# Patient Record
Sex: MALE | Race: WHITE | NOT HISPANIC OR LATINO | Employment: PART TIME | URBAN - METROPOLITAN AREA
[De-identification: names, ages, dates, MRNs, and addresses within clinical notes are randomized per-mention and may not be internally consistent; named-entity substitution may affect disease eponyms.]

---

## 2017-11-02 ENCOUNTER — APPOINTMENT (EMERGENCY)
Dept: RADIOLOGY | Facility: HOSPITAL | Age: 32
End: 2017-11-02

## 2017-11-02 ENCOUNTER — HOSPITAL ENCOUNTER (EMERGENCY)
Facility: HOSPITAL | Age: 32
Discharge: HOME | End: 2017-11-03
Attending: EMERGENCY MEDICINE | Admitting: EMERGENCY MEDICINE

## 2017-11-02 DIAGNOSIS — R07.9 CHEST PAIN: Primary | ICD-10-CM

## 2017-11-02 LAB
ALBUMIN SERPL BCP-MCNC: 4.5 G/DL (ref 3.5–5)
ALP SERPL-CCNC: 74 U/L (ref 46–116)
ALT SERPL W P-5'-P-CCNC: 72 U/L (ref 12–78)
ANION GAP SERPL CALCULATED.3IONS-SCNC: 8 MMOL/L (ref 4–13)
AST SERPL W P-5'-P-CCNC: 34 U/L (ref 5–45)
BASOPHILS # BLD AUTO: 0.04 THOUSANDS/ΜL (ref 0–0.1)
BASOPHILS NFR BLD AUTO: 1 % (ref 0–1)
BILIRUB SERPL-MCNC: 0.8 MG/DL (ref 0.2–1)
BUN SERPL-MCNC: 19 MG/DL (ref 5–25)
CALCIUM SERPL-MCNC: 9.3 MG/DL (ref 8.3–10.1)
CHLORIDE SERPL-SCNC: 101 MMOL/L (ref 100–108)
CK SERPL-CCNC: 143 U/L (ref 39–308)
CO2 SERPL-SCNC: 28 MMOL/L (ref 21–32)
CREAT SERPL-MCNC: 1.09 MG/DL (ref 0.6–1.3)
DEPRECATED D DIMER PPP: <270 NG/ML (FEU) (ref 0–424)
EOSINOPHIL # BLD AUTO: 0.14 THOUSAND/ΜL (ref 0–0.61)
EOSINOPHIL NFR BLD AUTO: 2 % (ref 0–6)
ERYTHROCYTE [DISTWIDTH] IN BLOOD BY AUTOMATED COUNT: 12.4 % (ref 11.6–15.1)
GFR SERPL CREATININE-BSD FRML MDRD: 89 ML/MIN/1.73SQ M
GLUCOSE SERPL-MCNC: 90 MG/DL (ref 65–140)
HCT VFR BLD AUTO: 44.6 % (ref 36.5–49.3)
HGB BLD-MCNC: 15.3 G/DL (ref 12–17)
LYMPHOCYTES # BLD AUTO: 2.09 THOUSANDS/ΜL (ref 0.6–4.47)
LYMPHOCYTES NFR BLD AUTO: 31 % (ref 14–44)
MCH RBC QN AUTO: 28.1 PG (ref 26.8–34.3)
MCHC RBC AUTO-ENTMCNC: 34.3 G/DL (ref 31.4–37.4)
MCV RBC AUTO: 82 FL (ref 82–98)
MONOCYTES # BLD AUTO: 0.65 THOUSAND/ΜL (ref 0.17–1.22)
MONOCYTES NFR BLD AUTO: 10 % (ref 4–12)
NEUTROPHILS # BLD AUTO: 3.92 THOUSANDS/ΜL (ref 1.85–7.62)
NEUTS SEG NFR BLD AUTO: 56 % (ref 43–75)
PLATELET # BLD AUTO: 251 THOUSANDS/UL (ref 149–390)
PMV BLD AUTO: 11.2 FL (ref 8.9–12.7)
POTASSIUM SERPL-SCNC: 3.8 MMOL/L (ref 3.5–5.3)
PROT SERPL-MCNC: 7.8 G/DL (ref 6.4–8.2)
RBC # BLD AUTO: 5.45 MILLION/UL (ref 3.88–5.62)
SODIUM SERPL-SCNC: 137 MMOL/L (ref 136–145)
TROPONIN I SERPL-MCNC: <0.02 NG/ML
WBC # BLD AUTO: 6.84 THOUSAND/UL (ref 4.31–10.16)

## 2017-11-02 PROCEDURE — 80053 COMPREHEN METABOLIC PANEL: CPT | Performed by: EMERGENCY MEDICINE

## 2017-11-02 PROCEDURE — 36415 COLL VENOUS BLD VENIPUNCTURE: CPT | Performed by: EMERGENCY MEDICINE

## 2017-11-02 PROCEDURE — 93005 ELECTROCARDIOGRAM TRACING: CPT

## 2017-11-02 PROCEDURE — 85379 FIBRIN DEGRADATION QUANT: CPT | Performed by: EMERGENCY MEDICINE

## 2017-11-02 PROCEDURE — 93005 ELECTROCARDIOGRAM TRACING: CPT | Performed by: EMERGENCY MEDICINE

## 2017-11-02 PROCEDURE — 71020 HB CHEST X-RAY 2VW FRONTAL&LATL: CPT

## 2017-11-02 PROCEDURE — 84484 ASSAY OF TROPONIN QUANT: CPT | Performed by: EMERGENCY MEDICINE

## 2017-11-02 PROCEDURE — 85025 COMPLETE CBC W/AUTO DIFF WBC: CPT | Performed by: EMERGENCY MEDICINE

## 2017-11-02 PROCEDURE — 82550 ASSAY OF CK (CPK): CPT | Performed by: EMERGENCY MEDICINE

## 2017-11-02 RX ORDER — ASPIRIN 81 MG/1
162 TABLET, CHEWABLE ORAL ONCE
Status: COMPLETED | OUTPATIENT
Start: 2017-11-02 | End: 2017-11-02

## 2017-11-02 RX ORDER — 0.9 % SODIUM CHLORIDE 0.9 %
3 VIAL (ML) INJECTION AS NEEDED
Status: DISCONTINUED | OUTPATIENT
Start: 2017-11-02 | End: 2017-11-03 | Stop reason: HOSPADM

## 2017-11-02 RX ADMIN — NITROGLYCERIN 0.5 INCH: 20 OINTMENT TOPICAL at 23:06

## 2017-11-02 RX ADMIN — ASPIRIN 81 MG 162 MG: 81 TABLET ORAL at 23:05

## 2017-11-03 VITALS
SYSTOLIC BLOOD PRESSURE: 113 MMHG | WEIGHT: 181.5 LBS | HEART RATE: 88 BPM | OXYGEN SATURATION: 97 % | TEMPERATURE: 98 F | DIASTOLIC BLOOD PRESSURE: 68 MMHG | RESPIRATION RATE: 18 BRPM

## 2017-11-03 LAB
ATRIAL RATE: 82 BPM
ATRIAL RATE: 88 BPM
P AXIS: 32 DEGREES
P AXIS: 42 DEGREES
PR INTERVAL: 140 MS
PR INTERVAL: 160 MS
QRS AXIS: 66 DEGREES
QRS AXIS: 70 DEGREES
QRSD INTERVAL: 90 MS
QRSD INTERVAL: 90 MS
QT INTERVAL: 344 MS
QT INTERVAL: 354 MS
QTC INTERVAL: 401 MS
QTC INTERVAL: 404 MS
T WAVE AXIS: 37 DEGREES
T WAVE AXIS: 39 DEGREES
TROPONIN I SERPL-MCNC: <0.02 NG/ML
VENTRICULAR RATE: 77 BPM
VENTRICULAR RATE: 83 BPM

## 2017-11-03 PROCEDURE — 99285 EMERGENCY DEPT VISIT HI MDM: CPT

## 2017-11-03 PROCEDURE — 36415 COLL VENOUS BLD VENIPUNCTURE: CPT | Performed by: EMERGENCY MEDICINE

## 2017-11-03 PROCEDURE — 84484 ASSAY OF TROPONIN QUANT: CPT | Performed by: EMERGENCY MEDICINE

## 2017-11-03 NOTE — ED PROVIDER NOTES
History  Chief Complaint   Patient presents with    Chest Pain     Pt reports intermittent CP and SOB for 7 days  Patient is a 28year old male with 1 week of intermittent chest pain with sob and pleuritic pain  Is visiting family here and came from [place]  Denies cocaine use  No fever  No N/V  No cough  Denies smoking  Father had MI at age 58  No recent old records from this ED seen on computer system  [facility] website checked on this patient and patient not found  Tried ibuprofen which made pain worse  History provided by:  Patient   used: No    Chest Pain   Associated symptoms: shortness of breath    Associated symptoms: no cough, no fever, no nausea and not vomiting        None       History reviewed  No pertinent past medical history  History reviewed  No pertinent surgical history  No family history on file  I have reviewed and agree with the history as documented  Social History   Substance Use Topics    Smoking status: Never Smoker    Smokeless tobacco: Never Used    Alcohol use No        Review of Systems   Constitutional: Negative for fever  Respiratory: Positive for shortness of breath  Negative for cough  Cardiovascular: Positive for chest pain  Gastrointestinal: Negative for nausea and vomiting  All other systems reviewed and are negative        Physical Exam  ED Triage Vitals   Temperature Pulse Respirations Blood Pressure SpO2   11/02/17 2244 11/02/17 2244 11/02/17 2244 11/02/17 2244 11/02/17 2244   98 °F (36 7 °C) 92 18 137/81 98 %      Temp Source Heart Rate Source Patient Position - Orthostatic VS BP Location FiO2 (%)   11/02/17 2244 11/02/17 2244 11/02/17 2244 11/02/17 2244 --   Oral Monitor Sitting Right arm       Pain Score       11/02/17 2259       5           Orthostatic Vital Signs  Vitals:    11/02/17 2244 11/03/17 0000 11/03/17 0116   BP: 137/81 127/82 123/69   Pulse: 92 87 81   Patient Position - Orthostatic VS: Sitting Lying Lying       Physical Exam   Constitutional: He is oriented to person, place, and time  He appears well-developed and well-nourished  He appears distressed (mild)  HENT:   Head: Normocephalic and atraumatic  Mouth/Throat: Oropharynx is clear and moist    Eyes: No scleral icterus  Neck: Normal range of motion  Neck supple  Cardiovascular: Normal rate, regular rhythm and normal heart sounds  No murmur heard  Pulmonary/Chest: Effort normal and breath sounds normal  No stridor  No respiratory distress  He has no wheezes  He has no rales  He exhibits no tenderness  Abdominal: Soft  Bowel sounds are normal  He exhibits no distension  There is no tenderness  Musculoskeletal: He exhibits no edema, tenderness (No calf tenderness) or deformity  Neurological: He is alert and oriented to person, place, and time  Skin: Skin is warm and dry  No rash noted  Psychiatric: He has a normal mood and affect  Nursing note and vitals reviewed  ED Medications  Medications   sodium chloride (PF) 0 9 % injection 3 mL (not administered)   aspirin chewable tablet 162 mg (162 mg Oral Given 11/2/17 2305)   nitroglycerin (NITRO-BID) 2 % TD ointment 0 5 inch (0 5 inches Topical Given 11/2/17 2306)       Diagnostic Studies  Results Reviewed     Procedure Component Value Units Date/Time    Troponin I [[id number]]  (Normal) Collected:  11/03/17 0115    Lab Status:  Final result Specimen:  Blood from Arm, Left Updated:  11/03/17 0138     Troponin I <0 02 ng/mL     Narrative:         Siemens Chemistry analyzer 99% cutoff is > 0 04 ng/mL in network labs    o cTnI 99% cutoff is useful only when applied to patients in the clinical setting of myocardial ischemia  o cTnI 99% cutoff should be interpreted in the context of clinical history, ECG findings and possibly cardiac imaging to establish correct diagnosis    o cTnI 99% cutoff may be suggestive but clearly not indicative of a coronary event without the clinical setting of myocardial ischemia  CK Total with Reflex CKMB [[id number]]  (Normal) Collected:  11/02/17 2315    Lab Status:  Final result Specimen:  Blood from Arm, Left Updated:  11/02/17 2356     Total  U/L     D-dimer, quantitative [[id number]]  (Normal) Collected:  11/02/17 2315    Lab Status:  Final result Specimen:  Blood from Arm, Left Updated:  11/02/17 2355     D-Dimer, Junious Andi <270 ng/ml (FEU)     Troponin I [[id number]]  (Normal) Collected:  11/02/17 2315    Lab Status:  Final result Specimen:  Blood from Arm, Left Updated:  11/02/17 2340     Troponin I <0 02 ng/mL     Narrative:         Siemens Chemistry analyzer 99% cutoff is > 0 04 ng/mL in network labs    o cTnI 99% cutoff is useful only when applied to patients in the clinical setting of myocardial ischemia  o cTnI 99% cutoff should be interpreted in the context of clinical history, ECG findings and possibly cardiac imaging to establish correct diagnosis  o cTnI 99% cutoff may be suggestive but clearly not indicative of a coronary event without the clinical setting of myocardial ischemia  Comprehensive metabolic panel [[id number]] Collected:  11/02/17 2315    Lab Status:  Final result Specimen:  Blood from Arm, Left Updated:  11/02/17 2339     Sodium 137 mmol/L      Potassium 3 8 mmol/L      Chloride 101 mmol/L      CO2 28 mmol/L      Anion Gap 8 mmol/L      BUN 19 mg/dL      Creatinine 1 09 mg/dL      Glucose 90 mg/dL      Calcium 9 3 mg/dL      AST 34 U/L      ALT 72 U/L      Alkaline Phosphatase 74 U/L      Total Protein 7 8 g/dL      Albumin 4 5 g/dL      Total Bilirubin 0 80 mg/dL      eGFR 89 ml/min/1 73sq m     Narrative:         National Kidney Disease Education Program recommendations are as follows:  GFR calculation is accurate only with a steady state creatinine  Chronic Kidney disease less than 60 ml/min/1 73 sq  meters  Kidney failure less than 15 ml/min/1 73 sq  meters      CBC and differential [[id number]]  (Normal) Collected:  11/02/17 2315    Lab Status:  Final result Specimen:  Blood from Arm, Left Updated:  11/02/17 2322     WBC 6 84 Thousand/uL      RBC 5 45 Million/uL      Hemoglobin 15 3 g/dL      Hematocrit 44 6 %      MCV 82 fL      MCH 28 1 pg      MCHC 34 3 g/dL      RDW 12 4 %      MPV 11 2 fL      Platelets 553 Thousands/uL      Neutrophils Relative 56 %      Lymphocytes Relative 31 %      Monocytes Relative 10 %      Eosinophils Relative 2 %      Basophils Relative 1 %      Neutrophils Absolute 3 92 Thousands/µL      Lymphocytes Absolute 2 09 Thousands/µL      Monocytes Absolute 0 65 Thousand/µL      Eosinophils Absolute 0 14 Thousand/µL      Basophils Absolute 0 04 Thousands/µL                  X-ray chest 2 views   ED Interpretation by Viktor Smith MD (11/02 0213)   No acute disease read by me                    Procedures  ECG 12 Lead Documentation  Date/Time: 11/2/2017 11:00 PM  Performed by: Kirti Boles  Authorized by: Kirti Boles     Indications / Diagnosis:  Chest pain  ECG reviewed by me, the ED Provider: yes    Patient location:  ED  Previous ECG:     Previous ECG:  Unavailable  Interpretation:     Interpretation: normal    Rate:     ECG rate:  83    ECG rate assessment: normal    Rhythm:     Rhythm: sinus rhythm    Ectopy:     Ectopy: none    QRS:     QRS axis:  Normal  Conduction:     Conduction: normal    ST segments:     ST segments:  Normal  T waves:     T waves: normal      ECG 12 Lead Documentation  Date/Time: 11/3/2017 1:17 AM  Performed by: Kirti Boles  Authorized by: Kirti Boles     Indications / Diagnosis:  Repeat EKG for chest pain  ECG reviewed by me, the ED Provider: yes    Patient location:  ED  Previous ECG:     Previous ECG:  Compared to current    Comparison ECG info:  From earlier tonight    Similarity:  No change  Interpretation:     Interpretation: normal    Rate:     ECG rate:  77    ECG rate assessment: normal    Rhythm:     Rhythm: sinus rhythm    Ectopy:     Ectopy: none QRS:     QRS axis:  Normal  Conduction:     Conduction: normal    ST segments:     ST segments:  Normal  T waves:     T waves: normal             Phone Contacts  ED Phone Contact    ED Course  ED Course as of Nov 03 0145   Thu Nov 02, 2017   2358 Pain improved  Will recheck EKG and troponin at 0115  Fri Nov 03, 2017   0143 Patient resting comfortably prior to discharge  HEART Risk Score    Flowsheet Row Most Recent Value   History  1 Filed at: 11/03/2017 0144   ECG  0 Filed at: 11/03/2017 0144   Age  0 Filed at: 11/03/2017 0144   Risk Factors  1 Filed at: 11/03/2017 0144   Troponin  0 Filed at: 11/03/2017 0144   Heart Score Risk Calculator   History  1 Filed at: 11/03/2017 0144   ECG  0 Filed at: 11/03/2017 0144   Age  0 Filed at: 11/03/2017 0144   Risk Factors  1 Filed at: 11/03/2017 0144   Troponin  0 Filed at: 11/03/2017 0144   HEART Score  2 Filed at: 11/03/2017 0144   HEART Score  2 Filed at: 11/03/2017 0144                    LYNDSEY Risk Score    Flowsheet Row Most Recent Value   Age >= 72  0 Filed at: 11/03/2017 0144   Known CAD (stenosis >= 50%)  0 Filed at: 11/03/2017 0144   Recent (<=24 hrs) Service Angina  0 Filed at: 11/03/2017 0144   ST Deviation >= 0 5 mm  0 Filed at: 11/03/2017 0144   3+ CAD Risk Factors (FHx, HTN, HLP, DM, Smoker)  0 Filed at: 11/03/2017 0144   Aspirin Use Past 7 Days  0 Filed at: 11/03/2017 0144   Elevated Cardiac Markers  0 Filed at: 11/03/2017 0144   LYNDSEY Risk Score (Calculated)  0 Filed at: 11/03/2017 0144              MDM  Number of Diagnoses or Management Options  Diagnosis management comments: DDX including but not limited to: ACS, MI, PE, PTX, pneumonia, doubt dissection, pleurisy, pericarditis, myocarditis, rhabdomyolysis, GI etiology          Amount and/or Complexity of Data Reviewed  Clinical lab tests: ordered and reviewed  Tests in the radiology section of CPT®: ordered and reviewed  Decide to obtain previous medical records or to obtain history from someone other than the patient: yes  Independent visualization of images, tracings, or specimens: yes      CritCare Time    Disposition  Final diagnoses:   Chest pain     Time reflects when diagnosis was documented in both MDM as applicable and the Disposition within this note     Time User Action Codes Description Comment    11/3/2017  1:44 AM Falkland Leonico Add [R07 9] Chest pain       ED Disposition     ED Disposition Condition Comment    Discharge  [address]  discharge to home/self care  Condition at discharge: Stable        Follow-up Information     Follow up With Specialties Details Why Contact Info    info link  Call in 1 day aspirin for pain  Return sooner if increased pain, fever, vomiting, difficulty breathing, rash  [phone]          Patient's Medications    No medications on file     No discharge procedures on file      ED Provider  Electronically Signed by           Conner Whitney MD  11/03/17 8875

## 2017-11-03 NOTE — DISCHARGE INSTRUCTIONS
Chest Pain   WHAT YOU NEED TO KNOW:   Chest pain can be caused by a range of conditions, from not serious to life-threatening  Chest pain can be a symptom of a digestive problem, such as acid reflux or a stomach ulcer  An anxiety attack or a strong emotion, such as anger, can also cause chest pain  Infection, inflammation, or a fracture in the bones or cartilage in your chest can cause pain or discomfort  Sometimes chest pain or pressure is caused by poor blood flow to your heart (angina)  Chest pain may also be caused by life-threatening conditions such as a heart attack or blood clot in your lungs  DISCHARGE INSTRUCTIONS:   Call 911 if:   · You have any of the following signs of a heart attack:      ¨ Squeezing, pressure, or pain in your chest that lasts longer than 5 minutes or returns    ¨ Discomfort or pain in your back, neck, jaw, stomach, or arm     ¨ Trouble breathing    ¨ Nausea or vomiting    ¨ Lightheadedness or a sudden cold sweat, especially with chest pain or trouble breathing    Return to the emergency department if:   · You have chest discomfort that gets worse, even with medicine  · You cough or vomit blood  · Your bowel movements are black or bloody  · You cannot stop vomiting, or it hurts to swallow  Contact your healthcare provider if:   · You have questions or concerns about your condition or care  Medicines:   · Medicines  may be given to treat the cause of your chest pain  Examples include pain medicine, anxiety medicine, or medicines to increase blood flow to your heart  · Do not take certain medicines without asking your healthcare provider first   These include NSAIDs, herbal or vitamin supplements, or hormones (estrogen or progestin)  · Take your medicine as directed  Contact your healthcare provider if you think your medicine is not helping or if you have side effects  Tell him or her if you are allergic to any medicine   Keep a list of the medicines, vitamins, and herbs you take  Include the amounts, and when and why you take them  Bring the list or the pill bottles to follow-up visits  Carry your medicine list with you in case of an emergency  Follow up with your healthcare provider within 72 hours, or as directed: You may need to return for more tests to find the cause of your chest pain  You may be referred to a specialist, such as a cardiologist or gastroenterologist  Write down your questions so you remember to ask them during your visits  Healthy living tips: The following are general healthy guidelines  If your chest pain is caused by a heart problem, your healthcare provider will give you specific guidelines to follow  · Do not smoke  Nicotine and other chemicals in cigarettes and cigars can cause lung and heart damage  Ask your healthcare provider for information if you currently smoke and need help to quit  E-cigarettes or smokeless tobacco still contain nicotine  Talk to your healthcare provider before you use these products  · Eat a variety of healthy, low-fat foods  Healthy foods include fruits, vegetables, whole-grain breads, low-fat dairy products, beans, lean meats, and fish  Ask for more information about a heart healthy diet  · Ask about activity  Your healthcare provider will tell you which activities to limit or avoid  Ask when you can drive, return to work, and have sex  Ask about the best exercise plan for you  · Maintain a healthy weight  Ask your healthcare provider how much you should weigh  Ask him or her to help you create a weight loss plan if you are overweight  · Get the flu and pneumonia vaccines  All adults should get the influenza (flu) vaccine  Get it every year as soon as it becomes available  The pneumococcal vaccine is given to adults aged 72 years or older  The vaccine is given every 5 years to prevent pneumococcal disease, such as pneumonia    © 2017 [employer] Information is for End User's use only and may not be sold, redistributed or otherwise used for commercial purposes  All illustrations and images included in CareNotes® are the copyrighted property of A D A M , Inc  or Kyree Villeda  The above information is an  only  It is not intended as medical advice for individual conditions or treatments  Talk to your doctor, nurse or pharmacist before following any medical regimen to see if it is safe and effective for you

## 2023-04-20 ENCOUNTER — HOSPITAL ENCOUNTER (EMERGENCY)
Facility: HOSPITAL | Age: 38
Discharge: HOME | End: 2023-04-20
Attending: EMERGENCY MEDICINE | Admitting: EMERGENCY MEDICINE

## 2023-04-20 ENCOUNTER — APPOINTMENT (EMERGENCY)
Dept: RADIOLOGY | Facility: HOSPITAL | Age: 38
End: 2023-04-20

## 2023-04-20 VITALS
HEART RATE: 89 BPM | TEMPERATURE: 98.2 F | OXYGEN SATURATION: 97 % | RESPIRATION RATE: 24 BRPM | DIASTOLIC BLOOD PRESSURE: 85 MMHG | SYSTOLIC BLOOD PRESSURE: 136 MMHG

## 2023-04-20 DIAGNOSIS — J45.909 ACUTE ASTHMATIC BRONCHITIS: Primary | ICD-10-CM

## 2023-04-20 LAB
ALBUMIN SERPL BCP-MCNC: 4.8 G/DL (ref 3.5–5)
ALP SERPL-CCNC: 49 U/L (ref 34–104)
ALT SERPL W P-5'-P-CCNC: 108 U/L (ref 7–52)
ANION GAP SERPL CALCULATED.3IONS-SCNC: 6 MMOL/L (ref 4–13)
AST SERPL W P-5'-P-CCNC: 72 U/L (ref 13–39)
BASOPHILS # BLD AUTO: 0.13 THOUSANDS/ΜL (ref 0–0.1)
BASOPHILS NFR BLD AUTO: 2 % (ref 0–1)
BILIRUB SERPL-MCNC: 0.67 MG/DL (ref 0.2–1)
BUN SERPL-MCNC: 17 MG/DL (ref 5–25)
CALCIUM SERPL-MCNC: 8.8 MG/DL (ref 8.4–10.2)
CARDIAC TROPONIN I PNL SERPL HS: 2 NG/L
CHLORIDE SERPL-SCNC: 107 MMOL/L (ref 96–108)
CO2 SERPL-SCNC: 21 MMOL/L (ref 21–32)
CREAT SERPL-MCNC: 0.89 MG/DL (ref 0.6–1.3)
D DIMER PPP FEU-MCNC: 0.3 UG/ML FEU
EOSINOPHIL # BLD AUTO: 1.09 THOUSAND/ΜL (ref 0–0.61)
EOSINOPHIL NFR BLD AUTO: 14 % (ref 0–6)
ERYTHROCYTE [DISTWIDTH] IN BLOOD BY AUTOMATED COUNT: 12.2 % (ref 11.6–15.1)
GFR SERPL CREATININE-BSD FRML MDRD: 109 ML/MIN/1.73SQ M
GLUCOSE SERPL-MCNC: 109 MG/DL (ref 65–140)
HCT VFR BLD AUTO: 48 % (ref 36.5–49.3)
HGB BLD-MCNC: 15.6 G/DL (ref 12–17)
IMM GRANULOCYTES # BLD AUTO: 0.06 THOUSAND/UL (ref 0–0.2)
IMM GRANULOCYTES NFR BLD AUTO: 1 % (ref 0–2)
LYMPHOCYTES # BLD AUTO: 1.22 THOUSANDS/ΜL (ref 0.6–4.47)
LYMPHOCYTES NFR BLD AUTO: 15 % (ref 14–44)
MCH RBC QN AUTO: 28.7 PG (ref 26.8–34.3)
MCHC RBC AUTO-ENTMCNC: 32.5 G/DL (ref 31.4–37.4)
MCV RBC AUTO: 88 FL (ref 82–98)
MONOCYTES # BLD AUTO: 0.41 THOUSAND/ΜL (ref 0.17–1.22)
MONOCYTES NFR BLD AUTO: 5 % (ref 4–12)
NEUTROPHILS # BLD AUTO: 5.14 THOUSANDS/ΜL (ref 1.85–7.62)
NEUTS SEG NFR BLD AUTO: 63 % (ref 43–75)
NRBC BLD AUTO-RTO: 0 /100 WBCS
PLATELET # BLD AUTO: 214 THOUSANDS/UL (ref 149–390)
PMV BLD AUTO: 11.7 FL (ref 8.9–12.7)
POTASSIUM SERPL-SCNC: 4.9 MMOL/L (ref 3.5–5.3)
POTASSIUM SERPL-SCNC: 6.5 MMOL/L (ref 3.5–5.3)
PROT SERPL-MCNC: 7.3 G/DL (ref 6.4–8.4)
RBC # BLD AUTO: 5.43 MILLION/UL (ref 3.88–5.62)
SODIUM SERPL-SCNC: 134 MMOL/L (ref 135–147)
WBC # BLD AUTO: 8.05 THOUSAND/UL (ref 4.31–10.16)

## 2023-04-20 RX ORDER — ALBUTEROL SULFATE 2.5 MG/3ML
2.5 SOLUTION RESPIRATORY (INHALATION) EVERY 6 HOURS PRN
Qty: 75 ML | Refills: 0 | Status: SHIPPED | OUTPATIENT
Start: 2023-04-20

## 2023-04-20 RX ORDER — ALBUTEROL SULFATE 90 UG/1
2 AEROSOL, METERED RESPIRATORY (INHALATION) EVERY 6 HOURS PRN
Qty: 8.5 G | Refills: 0 | Status: SHIPPED | OUTPATIENT
Start: 2023-04-20 | End: 2023-04-20

## 2023-04-20 RX ORDER — AZITHROMYCIN 250 MG/1
500 TABLET, FILM COATED ORAL ONCE
Status: COMPLETED | OUTPATIENT
Start: 2023-04-20 | End: 2023-04-20

## 2023-04-20 RX ORDER — ALBUTEROL SULFATE 2.5 MG/3ML
2.5 SOLUTION RESPIRATORY (INHALATION) ONCE
Status: COMPLETED | OUTPATIENT
Start: 2023-04-20 | End: 2023-04-20

## 2023-04-20 RX ORDER — MONTELUKAST SODIUM 10 MG/1
10 TABLET ORAL
Qty: 30 TABLET | Refills: 0 | Status: SHIPPED | OUTPATIENT
Start: 2023-04-20 | End: 2023-05-20

## 2023-04-20 RX ORDER — DEXAMETHASONE SODIUM PHOSPHATE 4 MG/ML
10 INJECTION, SOLUTION INTRA-ARTICULAR; INTRALESIONAL; INTRAMUSCULAR; INTRAVENOUS; SOFT TISSUE ONCE
Status: COMPLETED | OUTPATIENT
Start: 2023-04-20 | End: 2023-04-20

## 2023-04-20 RX ORDER — CETIRIZINE HYDROCHLORIDE 5 MG/1
10 TABLET ORAL DAILY
COMMUNITY

## 2023-04-20 RX ORDER — ALBUTEROL SULFATE 2.5 MG/3ML
2.5 SOLUTION RESPIRATORY (INHALATION) EVERY 6 HOURS PRN
Qty: 75 ML | Refills: 0 | Status: SHIPPED | OUTPATIENT
Start: 2023-04-20 | End: 2023-04-20

## 2023-04-20 RX ORDER — AZITHROMYCIN 250 MG/1
250 TABLET, FILM COATED ORAL EVERY 24 HOURS
Qty: 4 TABLET | Refills: 0 | Status: SHIPPED | OUTPATIENT
Start: 2023-04-21 | End: 2023-04-25

## 2023-04-20 RX ORDER — PREDNISONE 50 MG/1
50 TABLET ORAL DAILY
Qty: 5 TABLET | Refills: 0 | Status: SHIPPED | OUTPATIENT
Start: 2023-04-20 | End: 2023-04-25

## 2023-04-20 RX ORDER — ALBUTEROL SULFATE 90 UG/1
2 AEROSOL, METERED RESPIRATORY (INHALATION) EVERY 6 HOURS PRN
Qty: 8.5 G | Refills: 0 | Status: SHIPPED | OUTPATIENT
Start: 2023-04-20

## 2023-04-20 RX ORDER — ALBUTEROL SULFATE 90 UG/1
2 AEROSOL, METERED RESPIRATORY (INHALATION) EVERY 6 HOURS PRN
COMMUNITY

## 2023-04-20 RX ORDER — PREDNISONE 50 MG/1
50 TABLET ORAL DAILY
Qty: 5 TABLET | Refills: 0 | Status: SHIPPED | OUTPATIENT
Start: 2023-04-20 | End: 2023-04-20

## 2023-04-20 RX ADMIN — ALBUTEROL SULFATE 2.5 MG: 2.5 SOLUTION RESPIRATORY (INHALATION) at 02:06

## 2023-04-20 RX ADMIN — DEXAMETHASONE SODIUM PHOSPHATE 10 MG: 4 INJECTION, SOLUTION INTRAMUSCULAR; INTRAVENOUS at 02:10

## 2023-04-20 RX ADMIN — AZITHROMYCIN MONOHYDRATE 500 MG: 250 TABLET ORAL at 03:52

## 2023-04-20 RX ADMIN — ALBUTEROL SULFATE 2.5 MG: 2.5 SOLUTION RESPIRATORY (INHALATION) at 04:05

## 2023-04-20 NOTE — Clinical Note
Solange Wilkerson was seen and treated in our emergency department on 4/20/2023  Diagnosis:     Ricky Contreras  may return to work on return date  He may return on this date: 04/21/2023         If you have any questions or concerns, please don't hesitate to call        Chris Laura, DO    ______________________________           _______________          _______________  Hospital Representative                              Date                                Time

## 2023-04-20 NOTE — ED ATTENDING ATTESTATION
4/20/2023  I, Herminio Frankel, MD, saw and evaluated the patient  I have discussed the patient with the resident/non-physician practitioner and agree with the resident's/non-physician practitioner's findings, Plan of Care, and MDM as documented in the resident's/non-physician practitioner's note, except where noted  All available labs and Radiology studies were reviewed  I was present for key portions of any procedure(s) performed by the resident/non-physician practitioner and I was immediately available to provide assistance  At this point I agree with the current assessment done in the Emergency Department  I have conducted an independent evaluation of this patient a history and physical is as follows:  Patient is a 40year old male with a few days of worsening productive cough and sob  No chest pain  No fever  No N/V  No travel  No smoking  Was last seen in this ED on 11/2/17 for chest pain  [facility] website checked on this patient and no Rx found  NCAT  Mask in place  Lungs with occasional rhonci  Tachypnea  No rales or wheezing  Heart regular without murmur  Abdomen soft and nontender  Good bowel sounds  No edema  No rash noted  DDX including but not limited to: pneumonia, pleural effusion,  PE, PTX, ACS, MI, asthma exacerbation, long COVID 19, anemia, metabolic abnormality, renal failure  Will check labs  I reviewed EKG and CXR       ED Course         Critical Care Time  Procedures

## 2023-04-22 LAB
ATRIAL RATE: 81 BPM
P AXIS: 49 DEGREES
PR INTERVAL: 140 MS
QRS AXIS: 67 DEGREES
QRSD INTERVAL: 88 MS
QT INTERVAL: 348 MS
QTC INTERVAL: 404 MS
T WAVE AXIS: 61 DEGREES
VENTRICULAR RATE: 81 BPM

## 2023-05-03 NOTE — ED PROVIDER NOTES
History  Chief Complaint   Patient presents with    Shortness of Breath     Pt presents with SOB  Dx w covid on 3/22, sob since then  Has used his inhaler every day this week but has not used his inhaler since March 2022  Dyana Cárdenas is a 54-year-old male with a history of seasonal allergies, prior episode of reactive airway in conjunction with COVID last year, who presents with 4 to 5 days of productive cough with clear to yellow sputum production, intermittent wheezing requiring albuterol inhaler every 4 hours, and shortness of breath which started yesterday  Is an  who works with chemicals, however states that 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 Taking? albuterol (PROVENTIL HFA,VENTOLIN HFA) 90 mcg/act inhaler 4/20/2023  Yes Yes   Sig: Inhale 2 puffs every 6 (six) hours as needed for wheezing   cetirizine (ZyrTEC) 5 MG tablet   Yes Yes   Sig: Take 10 mg by mouth daily      Facility-Administered Medications: None       History reviewed  No pertinent past medical history  History reviewed  No pertinent surgical history  History reviewed  No pertinent family history  I have reviewed and agree with the history as documented  E-Cigarette/Vaping     E-Cigarette/Vaping Substances     Social History     Tobacco Use    Smoking status: Never    Smokeless tobacco: Never   Substance Use Topics    Alcohol use: No    Drug use: No        Review of Systems   Constitutional: Negative  Negative for appetite change, chills, diaphoresis, fatigue and fever     HENT: Negative for congestion, ear pain, rhinorrhea, sinus pressure, sinus pain, sneezing, sore throat, trouble swallowing and voice change  Eyes: Negative  Negative for pain, redness, itching and visual disturbance  Respiratory: Positive for cough, shortness of breath and wheezing  Negative for chest tightness  Cardiovascular: Negative  Negative for chest pain, palpitations and leg swelling  Gastrointestinal: Negative  Negative for abdominal distention, abdominal pain, constipation, diarrhea, nausea and vomiting  Endocrine: Negative  Negative for cold intolerance, heat intolerance, polydipsia, polyphagia and polyuria  Genitourinary: Negative  Musculoskeletal: Negative  Negative for back pain and neck pain  Skin: Negative  Negative for pallor and rash  Allergic/Immunologic: Negative  Neurological: Negative  Negative for dizziness, weakness, light-headedness and headaches  Hematological: Negative  Negative for adenopathy  Does not bruise/bleed easily  Psychiatric/Behavioral: Negative  Negative for confusion  All other systems reviewed and are negative  Physical Exam  ED Triage Vitals [04/20/23 0023]   Temperature Pulse Respirations Blood Pressure SpO2   98 2 °F (36 8 °C) 89 (!) 24 136/85 97 %      Temp Source Heart Rate Source Patient Position - Orthostatic VS BP Location FiO2 (%)   Oral Monitor Sitting Right arm --      Pain Score       --             Orthostatic Vital Signs  Vitals:    04/20/23 0023   BP: 136/85   Pulse: 89   Patient Position - Orthostatic VS: Sitting       Physical Exam  Vitals and nursing note reviewed  Constitutional:       General: He is not in acute distress  Appearance: He is well-developed  He is not diaphoretic  HENT:      Head: Normocephalic and atraumatic  Nose: Nose normal  No congestion or rhinorrhea  Mouth/Throat:      Mouth: Mucous membranes are moist       Pharynx: Oropharynx is clear  No pharyngeal swelling, oropharyngeal exudate or posterior oropharyngeal erythema     Eyes:      General: Right eye: No discharge  Left eye: No discharge  Extraocular Movements: Extraocular movements intact  Pupils: Pupils are equal, round, and reactive to light  Cardiovascular:      Rate and Rhythm: Normal rate and regular rhythm  No extrasystoles are present  Pulses: Normal pulses  Heart sounds: Normal heart sounds  Pulmonary:      Effort: Pulmonary effort is normal  Tachypnea present  No accessory muscle usage or respiratory distress  Breath sounds: Examination of the right-lower field reveals decreased breath sounds  Examination of the left-lower field reveals decreased breath sounds  Decreased breath sounds and rhonchi present  No wheezing or rales  Comments: Mild tachypnea  Lung sounds slightly diminished at the bilateral bases  Scattered rhonchi which clear with cough  Chest:      Chest wall: No tenderness  Abdominal:      General: Bowel sounds are normal  There is no distension  Palpations: Abdomen is soft  Tenderness: There is no abdominal tenderness  There is no guarding or rebound  Musculoskeletal:         General: Normal range of motion  Cervical back: Normal range of motion and neck supple  Right lower leg: No tenderness  No edema  Left lower leg: No tenderness  No edema  Comments: No calf erythema, swelling, or tenderness to palpation bilaterally  Lymphadenopathy:      Cervical: No cervical adenopathy  Skin:     General: Skin is warm and dry  Capillary Refill: Capillary refill takes less than 2 seconds  Coloration: Skin is not cyanotic or pale  Findings: No rash  Neurological:      General: No focal deficit present  Mental Status: He is alert     Psychiatric:         Mood and Affect: Mood normal          Behavior: Behavior normal          ED Medications  Medications   albuterol inhalation solution 2 5 mg (2 5 mg Nebulization Given 4/20/23 0206)   dexamethasone (DECADRON) injection 10 mg (10 mg Intravenous Given 4/20/23 0210)   azithromycin (ZITHROMAX) tablet 500 mg (500 mg Oral Given 4/20/23 0352)   albuterol inhalation solution 2 5 mg (2 5 mg Nebulization Given 4/20/23 0405)       Diagnostic Studies  Results Reviewed     Procedure Component Value Units Date/Time    Potassium [[id number]]  (Normal) Collected: 04/20/23 0243    Lab Status: Final result Specimen: Blood from Arm, Right Updated: 04/20/23 0353     Potassium 4 9 mmol/L     Comprehensive metabolic panel [[id number]]  (Abnormal) Collected: 04/20/23 0147    Lab Status: Final result Specimen: Blood from Arm, Right Updated: 04/20/23 0239     Sodium 134 mmol/L      Potassium 6 5 mmol/L      Chloride 107 mmol/L      CO2 21 mmol/L      ANION GAP 6 mmol/L      BUN 17 mg/dL      Creatinine 0 89 mg/dL      Glucose 109 mg/dL      Calcium 8 8 mg/dL      AST 72 U/L       U/L      Alkaline Phosphatase 49 U/L      Total Protein 7 3 g/dL      Albumin 4 8 g/dL      Total Bilirubin 0 67 mg/dL      eGFR 109 ml/min/1 73sq m     Narrative:      [facility] guidelines for Chronic Kidney Disease (CKD):     Stage 1 with normal or high GFR (GFR > 90 mL/min/1 73 square meters)    Stage 2 Mild CKD (GFR = 60-89 mL/min/1 73 square meters)    Stage 3A Moderate CKD (GFR = 45-59 mL/min/1 73 square meters)    Stage 3B Moderate CKD (GFR = 30-44 mL/min/1 73 square meters)    Stage 4 Severe CKD (GFR = 15-29 mL/min/1 73 square meters)    Stage 5 End Stage CKD (GFR <15 mL/min/1 73 square meters)  Note: GFR calculation is accurate only with a steady state creatinine    HS Troponin 0hr (reflex protocol) [[id number]]  (Normal) Collected: 04/20/23 0147    Lab Status: Final result Specimen: Blood from Arm, Right Updated: 04/20/23 0221     hs TnI 0hr 2 ng/L     D-Dimer [[id number]]  (Normal) Collected: 04/20/23 0147    Lab Status: Final result Specimen: Blood from Arm, Right Updated: 04/20/23 0210     D-Dimer, Quant 0 30 ug/ml FEU     CBC and differential [[id number]]  (Abnormal) Collected: 04/20/23 0147    Lab Status: Final result Specimen: Blood from Arm, Right Updated: 04/20/23 0156     WBC 8 05 Thousand/uL      RBC 5 43 Million/uL      Hemoglobin 15 6 g/dL      Hematocrit 48 0 %      MCV 88 fL      MCH 28 7 pg      MCHC 32 5 g/dL      RDW 12 2 %      MPV 11 7 fL      Platelets 941 Thousands/uL      nRBC 0 /100 WBCs      Neutrophils Relative 63 %      Immat GRANS % 1 %      Lymphocytes Relative 15 %      Monocytes Relative 5 %      Eosinophils Relative 14 %      Basophils Relative 2 %      Neutrophils Absolute 5 14 Thousands/µL      Immature Grans Absolute 0 06 Thousand/uL      Lymphocytes Absolute 1 22 Thousands/µL      Monocytes Absolute 0 41 Thousand/µL      Eosinophils Absolute 1 09 Thousand/µL      Basophils Absolute 0 13 Thousands/µL                  XR chest 2 views   Final Result by Lu Hoffman MD (04/20 0599)      No acute cardiopulmonary disease        Findings are stable            Workstation performed: [id number]               Procedures  ECG 12 Lead Documentation Only    Date/Time: 4/20/2023 1:52 AM  Performed by: Yadiel Gary DO  Authorized by: Yadiel Gary DO     Indications / Diagnosis:  SOB  ECG reviewed by me, the ED Provider: yes    Patient location:  ED  Previous ECG:     Previous ECG:  Compared to current    Comparison ECG info:  11/2017    Similarity:  No change  Interpretation:     Interpretation: normal    Rate:     ECG rate:  81    ECG rate assessment: normal    Rhythm:     Rhythm: sinus rhythm    Ectopy:     Ectopy: none    QRS:     QRS axis:  Normal    QRS intervals:  Normal  Conduction:     Conduction: normal    ST segments:     ST segments:  Normal  T waves:     T waves: normal            ED Course  ED Course as of 05/03/23 1332   Thu Apr 20, 2023   0215 D-Dimer, Quant: 0 30  Doubt PE    0227 hs TnI 0hr: 2  Given onset of symptoms 4 days ago, doubt ACS    0254 Potassium(!!): 6 5  Will repeat given it is hemolyzed  0345 Patient reassessed, is feeling much better, states his breathing improved drastically with the nebulizer, states he does not usually get this type of relief from the albuterol inhaler  Will discharge with nebulizer, prescription for nebulizer solution and inhaler, also with azithromycin for bronchitis, patient also requesting refill of previously prescribed montelukast   Recommend patient follows up with PCP if symptoms persist   Return precautions discussed  0354 Potassium: 4 9  WNL  PERC Rule for PE    Flowsheet Row Most Recent Value   PERC Rule for PE    Age >=50 0 Filed at: 04/20/2023 0127   HR >=100 0 Filed at: 04/20/2023 0127   O2 Sat on room air < 95% 1 Filed at: 04/20/2023 0127   History of PE or DVT 0 Filed at: 04/20/2023 0127   Recent trauma or surgery 0 Filed at: 04/20/2023 0127   Hemoptysis 0 Filed at: 04/20/2023 0127   Exogenous estrogen 0 Filed at: 04/20/2023 0127   Unilateral leg swelling 0 Filed at: 04/20/2023 0127   PERC Rule for PE Results 1 Filed at: 04/20/2023 0127                  Wells' Criteria for PE    Flowsheet Row Most Recent Value   Wells' Criteria for PE    Clinical signs and symptoms of DVT 0 Filed at: 04/20/2023 0128   PE is primary diagnosis or equally likely 0 Filed at: 04/20/2023 0128   HR >100 0 Filed at: 04/20/2023 0128   Immobilization at least 3 days or Surgery in the previous 4 weeks 0 Filed at: 04/20/2023 0128   Previous, objectively diagnosed PE or DVT 0 Filed at: 04/20/2023 0128   Hemoptysis 0 Filed at: 04/20/2023 0128   Malignancy with treatment within 6 months or palliative 0 Filed at: 04/20/2023 0128   Wells' Criteria Total 0 Filed at: 04/20/2023 0128            Medical Decision Making  Patient with history of reactive airway and seasonal allergies presents with shortness of breath and productive cough for the past several days, however does travel frequently    Physical exam as above, is mildly tachypneic but heart rate within normal limits, room air oxygen saturations within normal limits  Lung sounds with scattered rhonchi which clear with cough, no appreciable wheezes, no evidence of respiratory distress  No evidence of fluid overload  Remainder of physical exam unremarkable  Could represent acute asthma exacerbation versus bronchitis, however will obtain chest x-ray to evaluate for pneumonia, pulmonary edema, pneumothorax, and pleural effusion, D-dimer to evaluate for potential PE given recent travel, doubt ACS however will obtain troponin to evaluate for stress on heart, ECG to evaluate for arrhythmia  See ED course for remainder of MDM  Acute asthmatic bronchitis: acute illness or injury  Amount and/or Complexity of Data Reviewed  External Data Reviewed: labs, radiology, ECG and notes  Labs: ordered  Decision-making details documented in ED Course  Radiology: ordered and independent interpretation performed  ECG/medicine tests: ordered and independent interpretation performed  Risk  Prescription drug management  Disposition  Final diagnoses:   Acute asthmatic bronchitis     Time reflects when diagnosis was documented in both MDM as applicable and the Disposition within this note     Time User Action Codes Description Comment    4/20/2023  3:37 AM Lucía Braswell Add [V31 368] Asthma     4/20/2023  3:42 AM Lucía Braswell Add [J45 [id number]] Acute asthmatic bronchitis     4/20/2023  3:44 AM Lucía Braswell Modify [J45 [id number]] Acute asthmatic bronchitis     4/20/2023  3:44 AM Lucía Braswell Remove [E30 [id number]] Asthma       ED Disposition     ED Disposition   Discharge    Condition   Stable    Date/Time   Thu Apr 20, 2023 Shira 21 discharge to home/self care                 Follow-up Information    None         Discharge Medication List as of 4/20/2023  3:54 AM      START taking these medications    Details   albuterol (2 5 mg/3 mL) 0 083 % nebulizer solution Take 3 mL (2 5 mg total) by nebulization every 6 (six) hours as needed for wheezing or shortness of breath, Starting u 4/20/2023, Normal      !! albuterol (ProAir HFA) 90 mcg/act inhaler Inhale 2 puffs every 6 (six) hours as needed for wheezing, Starting u 4/20/2023, Normal      azithromycin (ZITHROMAX) 250 mg tablet Take 1 tablet (250 mg total) by mouth every 24 hours for 4 days Take 2 tablets today then 1 tablet daily x 4 days Do not start before April 21, 2023 , Starting Fri 4/21/2023, Until Tue 4/25/2023, Normal      montelukast (SINGULAIR) 10 mg tablet Take 1 tablet (10 mg total) by mouth daily at bedtime, Starting Thu 4/20/2023, Until Sat 5/20/2023, Normal      predniSONE 50 mg tablet Take 1 tablet (50 mg total) by mouth daily for 5 days, Starting Thu 4/20/2023, Until Tue 4/25/2023, Normal       !! - Potential duplicate medications found  Please discuss with provider  CONTINUE these medications which have NOT CHANGED    Details   !! albuterol (PROVENTIL HFA,VENTOLIN HFA) 90 mcg/act inhaler Inhale 2 puffs every 6 (six) hours as needed for wheezing, Historical Med      cetirizine (ZyrTEC) 5 MG tablet Take 10 mg by mouth daily, Historical Med       !! - Potential duplicate medications found  Please discuss with provider  Outpatient Discharge Orders   Nebulizer     Nebulizer Supplies       PDMP Review       Value Time User    PDMP Reviewed  Yes 4/20/2023  1:00 AM Ariella Shukla MD           ED Provider  Attending physically available and evaluated Yinka Altamirano    I managed the patient along with the ED Attending      Electronically Signed by         Lotus Elizabeth,   05/03/23 2690